# Patient Record
Sex: FEMALE | Race: WHITE | NOT HISPANIC OR LATINO | Employment: FULL TIME | RURAL
[De-identification: names, ages, dates, MRNs, and addresses within clinical notes are randomized per-mention and may not be internally consistent; named-entity substitution may affect disease eponyms.]

---

## 2020-08-20 ENCOUNTER — OFFICE VISIT (OUTPATIENT)
Dept: FAMILY MEDICINE CLINIC | Facility: CLINIC | Age: 40
End: 2020-08-20

## 2020-08-20 VITALS
TEMPERATURE: 98.4 F | DIASTOLIC BLOOD PRESSURE: 72 MMHG | HEART RATE: 105 BPM | BODY MASS INDEX: 26.22 KG/M2 | SYSTOLIC BLOOD PRESSURE: 122 MMHG | HEIGHT: 68 IN | WEIGHT: 173 LBS | RESPIRATION RATE: 18 BRPM | OXYGEN SATURATION: 98 %

## 2020-08-20 DIAGNOSIS — A60.04 HERPES SIMPLEX VULVOVAGINITIS: ICD-10-CM

## 2020-08-20 DIAGNOSIS — Z00.00 ENCOUNTER FOR PHYSICAL EXAMINATION: Primary | ICD-10-CM

## 2020-08-20 DIAGNOSIS — L98.9 SKIN LESION: Primary | ICD-10-CM

## 2020-08-20 DIAGNOSIS — Z13.220 SCREENING FOR HYPERLIPIDEMIA: ICD-10-CM

## 2020-08-20 PROBLEM — N60.01: Status: ACTIVE | Noted: 2020-08-20

## 2020-08-20 PROBLEM — A60.00 GENITAL HERPES: Status: ACTIVE | Noted: 2020-08-20

## 2020-08-20 PROCEDURE — 99202 OFFICE O/P NEW SF 15 MIN: CPT | Performed by: FAMILY MEDICINE

## 2020-08-20 RX ORDER — VALACYCLOVIR HYDROCHLORIDE 500 MG/1
500 TABLET, FILM COATED ORAL EVERY 12 HOURS
Qty: 6 TABLET | Refills: 12 | Status: SHIPPED | OUTPATIENT
Start: 2020-08-20 | End: 2021-05-06 | Stop reason: SDUPTHER

## 2020-08-20 NOTE — PROGRESS NOTES
Subjective   Fiorella Lo is a 39 y.o. female.     Chief Complaint   Patient presents with   • Suspicious Skin Lesion       Lesion:   Fiorella Lo is here today for a lesion. The lesion appeared gradual and has been occurring for 3 years.. The course has been increasing in size. The lesion is characterized as red, no sign of infection. The lesion is located over chest, face. The symptoms have been associated with recent enlargement and clinically suspicious for malignancy.        The following portions of the patient's history were reviewed and updated as appropriate: allergies, current medications, past family history, past medical history, past social history, past surgical history and problem list.    Allergies:  No Known Allergies    Social History:  Social History     Socioeconomic History   • Marital status:      Spouse name: Not on file   • Number of children: Not on file   • Years of education: Not on file   • Highest education level: Not on file   Tobacco Use   • Smoking status: Current Some Day Smoker   • Smokeless tobacco: Never Used   • Tobacco comment: Social smoker   Substance and Sexual Activity   • Alcohol use: Yes     Comment: Socially   • Drug use: Never       Family History:  Family History   Problem Relation Age of Onset   • Breast cancer Mother    • Liver disease Father    • Breast cancer Maternal Grandmother    • Breast cancer Paternal Grandmother        Past Medical History :  Patient Active Problem List   Diagnosis   • Skin lesion   • Genital herpes       Medication List:    Current Outpatient Medications:   •  valACYclovir (VALTREX) 500 MG tablet, Take 1 tablet by mouth Every 12 (Twelve) Hours., Disp: 6 tablet, Rfl: 12    Past Surgical History:  Past Surgical History:   Procedure Laterality Date   • HYSTERECTOMY  2011     partial still has both ovaries   • TUBAL ABDOMINAL LIGATION  2008       Review of Systems:  Review of Systems   Constitutional: Negative for activity change and fever.  "  HENT: Negative for ear pain, rhinorrhea, sinus pressure and voice change.    Eyes: Negative for visual disturbance.   Respiratory: Negative for cough and shortness of breath.    Cardiovascular: Negative for chest pain.   Gastrointestinal: Negative for abdominal pain, diarrhea, nausea and vomiting.   Endocrine: Negative for cold intolerance and heat intolerance.   Genitourinary: Negative for frequency and urgency.   Musculoskeletal: Negative for arthralgias.   Skin: Negative for rash.   Neurological: Negative for syncope.   Hematological: Does not bruise/bleed easily.   Psychiatric/Behavioral: Negative for depressed mood. The patient is not nervous/anxious.        Physical Exam:  Vital Signs:  Visit Vitals  /72   Pulse 105   Temp 98.4 °F (36.9 °C)   Resp 18   Ht 172.7 cm (68\")   Wt 78.5 kg (173 lb)   LMP  (LMP Unknown) Comment: 2011   SpO2 98%   BMI 26.30 kg/m²       Physical Exam   Constitutional: She is oriented to person, place, and time. She appears well-developed and well-nourished. She is cooperative. No distress.   HENT:   Head: Normocephalic and atraumatic.   Right Ear: External ear normal.   Left Ear: External ear normal.   Nose: Nose normal.   Mouth/Throat: Oropharynx is clear and moist. No oropharyngeal exudate.   Eyes: Pupils are equal, round, and reactive to light. Conjunctivae and EOM are normal. Right eye exhibits no discharge. Left eye exhibits no discharge. No scleral icterus.   Neck: Normal range of motion. Neck supple. No thyromegaly present.   Cardiovascular: Normal rate, regular rhythm, normal heart sounds and intact distal pulses. Exam reveals no gallop and no friction rub.   No murmur heard.  Pulmonary/Chest: Effort normal and breath sounds normal. No respiratory distress. She has no wheezes. She has no rales.   Abdominal: Soft. Bowel sounds are normal. She exhibits no distension. There is no tenderness. There is no rebound and no guarding.   Musculoskeletal: Normal range of motion. She " exhibits no edema, tenderness or deformity.   Lymphadenopathy:     She has no cervical adenopathy.   Neurological: She is alert and oriented to person, place, and time. She displays normal reflexes. No cranial nerve deficit. She exhibits normal muscle tone. Coordination normal.   Skin: Skin is warm and dry. Capillary refill takes less than 2 seconds. Turgor is normal. No rash noted. She is not diaphoretic. No erythema. No pallor.   Right forehead and right upper chest with pink papules, pearly   Psychiatric: She has a normal mood and affect. Her behavior is normal.   Vitals reviewed.      Assessment and Plan:  Problem List Items Addressed This Visit        Musculoskeletal and Integument    Skin lesion - Primary    Overview     Forehead and chest  Will schedule removal            Genitourinary    Genital herpes    Current Assessment & Plan     Medication sent in for treatment as needed         Relevant Medications    valACYclovir (VALTREX) 500 MG tablet          An After Visit Summary and PPPS were given to the patient.             I wore protective equipment throughout this patient encounter to include mask and gloves. Hand hygiene was performed before donning protective equipment and after removal when leaving the room.

## 2020-08-27 ENCOUNTER — OFFICE VISIT (OUTPATIENT)
Dept: FAMILY MEDICINE CLINIC | Facility: CLINIC | Age: 40
End: 2020-08-27

## 2020-08-27 VITALS
HEIGHT: 68 IN | WEIGHT: 176.6 LBS | TEMPERATURE: 98 F | SYSTOLIC BLOOD PRESSURE: 116 MMHG | OXYGEN SATURATION: 98 % | DIASTOLIC BLOOD PRESSURE: 68 MMHG | HEART RATE: 89 BPM | RESPIRATION RATE: 18 BRPM | BODY MASS INDEX: 26.76 KG/M2

## 2020-08-27 DIAGNOSIS — Z00.01 ENCOUNTER FOR ROUTINE ADULT PHYSICAL EXAM WITH ABNORMAL FINDINGS: Primary | ICD-10-CM

## 2020-08-27 DIAGNOSIS — N63.0 BREAST LUMP: ICD-10-CM

## 2020-08-27 DIAGNOSIS — Z90.710 H/O VAGINAL HYSTERECTOMY: ICD-10-CM

## 2020-08-27 DIAGNOSIS — Z12.31 SCREENING MAMMOGRAM, ENCOUNTER FOR: ICD-10-CM

## 2020-08-27 LAB
BILIRUB BLD-MCNC: NEGATIVE MG/DL
CLARITY, POC: CLEAR
COLOR UR: YELLOW
GLUCOSE UR STRIP-MCNC: NEGATIVE MG/DL
KETONES UR QL: NEGATIVE
LEUKOCYTE EST, POC: NEGATIVE
NITRITE UR-MCNC: NEGATIVE MG/ML
PH UR: 6 [PH] (ref 5–8)
PROT UR STRIP-MCNC: ABNORMAL MG/DL
RBC # UR STRIP: NEGATIVE /UL
SP GR UR: 1.01 (ref 1–1.03)
UROBILINOGEN UR QL: NORMAL

## 2020-08-27 PROCEDURE — 99396 PREV VISIT EST AGE 40-64: CPT | Performed by: FAMILY MEDICINE

## 2020-08-27 PROCEDURE — 81003 URINALYSIS AUTO W/O SCOPE: CPT | Performed by: FAMILY MEDICINE

## 2020-08-27 NOTE — PROGRESS NOTES
"  Chief Complaint   Patient presents with   • Annual Exam         Subjective   Fiorella Lo is a 39 y.o. female here for a Well Woman Visit. Energy level is described as fair and she is sleeping well. She sleeps 6 hours nightly. Last pap was October 2018 in [place]. Contraception: none. Patient exercises irregularly. Nutrition is described as in general, a \"healthy\" diet  . Her   libido is normal. She reports that she does not perform monthly self breast exam.      I personally reviewed and updated the patient's allergies, medications, problem list, and past medical, surgical, social, and family history.     Allergies:  No Known Allergies    Social History:  Social History     Socioeconomic History   • Marital status:      Spouse name: Not on file   • Number of children: Not on file   • Years of education: Not on file   • Highest education level: Not on file   Tobacco Use   • Smoking status: Current Some Day Smoker   • Smokeless tobacco: Never Used   • Tobacco comment: Social smoker   Substance and Sexual Activity   • Alcohol use: Yes     Comment: Socially   • Drug use: Never       Family History:  Family History   Problem Relation Age of Onset   • Breast cancer Mother    • Liver disease Father    • Breast cancer Maternal Grandmother    • Breast cancer Paternal Grandmother        Past Medical History :  Active Ambulatory Problems     Diagnosis Date Noted   • Skin lesion 08/20/2020   • Genital herpes 08/20/2020   • Encounter for routine adult physical exam with abnormal findings 08/27/2020   • Breast lump 08/27/2020   • H/O vaginal hysterectomy 08/27/2020     Resolved Ambulatory Problems     Diagnosis Date Noted   • No Resolved Ambulatory Problems     Past Medical History:   Diagnosis Date   • Allergic        Medication List:    Current Outpatient Medications:   •  valACYclovir (VALTREX) 500 MG tablet, Take 1 tablet by mouth Every 12 (Twelve) Hours. (Patient taking differently: Take 500 mg by mouth Daily As " "Needed.), Disp: 6 tablet, Rfl: 12    Past Surgical History:  Past Surgical History:   Procedure Laterality Date   • HYSTERECTOMY  2011     partial still has both ovaries   • TUBAL ABDOMINAL LIGATION  2008       Depression Screen:   No flowsheet data found.    Fall Risk Screen:  RAMIRO Fall Risk Assessment has not been completed.    Review Of Systems:  Review of Systems   Constitutional: Negative for activity change and fever.   HENT: Negative for ear pain, rhinorrhea, sinus pressure and voice change.    Eyes: Negative for visual disturbance.   Respiratory: Negative for cough and shortness of breath.    Cardiovascular: Negative for chest pain.   Gastrointestinal: Negative for abdominal pain, diarrhea, nausea and vomiting.   Endocrine: Negative for cold intolerance and heat intolerance.   Genitourinary: Negative for frequency and urgency.   Musculoskeletal: Negative for arthralgias.   Skin: Negative for rash.   Neurological: Negative for syncope.   Hematological: Does not bruise/bleed easily.   Psychiatric/Behavioral: Negative for depressed mood. The patient is not nervous/anxious.        Physical Exam:  Vital Signs:  Visit Vitals  /68   Pulse 89   Temp 98 °F (36.7 °C)   Resp 18   Ht 172.7 cm (68\")   Wt 80.1 kg (176 lb 9.6 oz)   LMP  (LMP Unknown) Comment: 2011   SpO2 98%   BMI 26.85 kg/m²       Physical Exam   Constitutional: She is oriented to person, place, and time. She appears well-developed and well-nourished. No distress.   HENT:   Head: Normocephalic and atraumatic.   Right Ear: External ear normal. No drainage or swelling. Tympanic membrane is not erythematous. No middle ear effusion. cerumen impaction is not present.  Left Ear: External ear normal. No drainage or swelling. Tympanic membrane is not erythematous.  No middle ear effusion. An impacted cerumen is not present.  Nose: Nose normal.   Mouth/Throat: Oropharynx is clear and moist. No oropharyngeal exudate.   Eyes: Pupils are equal, round, and " reactive to light. Conjunctivae and EOM are normal. No scleral icterus.   Neck: Normal range of motion. Neck supple. No tracheal deviation present. No thyromegaly present.   Cardiovascular: Normal rate, regular rhythm, normal heart sounds and intact distal pulses. Exam reveals no friction rub.   No murmur heard.  Pulmonary/Chest: Effort normal and breath sounds normal. No stridor. No respiratory distress. She has no wheezes. She has no rales. Right breast exhibits mass. Right breast exhibits no inverted nipple, no nipple discharge, no skin change and no tenderness. Left breast exhibits no inverted nipple, no mass, no nipple discharge, no skin change and no tenderness.       Abdominal: Soft. Bowel sounds are normal. She exhibits no distension and no mass. There is no tenderness. No hernia.   Genitourinary: Rectum normal. Rectal exam shows guaiac negative stool. Uterus is absent.   Cervix is absent. Right adnexum displays no mass. Right adnexum is present.Left adnexum displays no mass. Left adnexum is present.No erythema or tenderness in the vagina. No vaginal discharge found.   Musculoskeletal: Normal range of motion. She exhibits no edema, tenderness or deformity.   Lymphadenopathy:     She has no cervical adenopathy.   Neurological: She is alert and oriented to person, place, and time. She has normal reflexes. She displays normal reflexes. No cranial nerve deficit or sensory deficit. She exhibits normal muscle tone. Coordination and gait normal.   Skin: Skin is warm and dry. No rash noted. She is not diaphoretic.   Psychiatric: She has a normal mood and affect. Her behavior is normal.   Vitals reviewed.        Assessment and Plan:  Problem List Items Addressed This Visit        Other    Encounter for routine adult physical exam with abnormal findings - Primary    Relevant Orders    POCT urinalysis dipstick, multipro (Completed)    Breast lump    Overview     Right at 10 o clock         Relevant Orders    Mammo  Diagnostic Digital Tomosynthesis Right With CAD    US Breast Right Limited    H/O vaginal hysterectomy    Overview     For bleeding. 2011  No cancer           Other Visit Diagnoses     Screening mammogram, encounter for        Relevant Orders    Mammo Screening Modified With Tomosynthesis Left With CAD          An After Visit Summary and PPPS were given to the patient.       Discussed wearing sunscreen, seatbelts. Avoidance or caution with alcohol. Safe sex practices discussed. Discussed screening as appropriate for age.     I wore protective equipment throughout this patient encounter to include mask and gloves. Hand hygiene was performed before donning protective equipment and after removal when leaving the room.

## 2020-08-28 LAB
ALBUMIN SERPL-MCNC: 4.7 G/DL (ref 3.8–4.8)
ALBUMIN/GLOB SERPL: 2.4 {RATIO} (ref 1.2–2.2)
ALP SERPL-CCNC: 37 IU/L (ref 39–117)
ALT SERPL-CCNC: 14 IU/L (ref 0–32)
AST SERPL-CCNC: 16 IU/L (ref 0–40)
BASOPHILS # BLD AUTO: 0.1 X10E3/UL (ref 0–0.2)
BASOPHILS NFR BLD AUTO: 1 %
BILIRUB SERPL-MCNC: 0.7 MG/DL (ref 0–1.2)
BUN SERPL-MCNC: 8 MG/DL (ref 6–24)
BUN/CREAT SERPL: 13 (ref 9–23)
CALCIUM SERPL-MCNC: 9.6 MG/DL (ref 8.7–10.2)
CHLORIDE SERPL-SCNC: 102 MMOL/L (ref 96–106)
CHOLEST SERPL-MCNC: 178 MG/DL (ref 100–199)
CHOLEST/HDLC SERPL: 3.5 RATIO (ref 0–4.4)
CO2 SERPL-SCNC: 26 MMOL/L (ref 20–29)
CREAT SERPL-MCNC: 0.62 MG/DL (ref 0.57–1)
EOSINOPHIL # BLD AUTO: 0.2 X10E3/UL (ref 0–0.4)
EOSINOPHIL NFR BLD AUTO: 2 %
ERYTHROCYTE [DISTWIDTH] IN BLOOD BY AUTOMATED COUNT: 12.3 % (ref 11.7–15.4)
GLOBULIN SER CALC-MCNC: 2 G/DL (ref 1.5–4.5)
GLUCOSE SERPL-MCNC: 84 MG/DL (ref 65–99)
HCT VFR BLD AUTO: 42.1 % (ref 34–46.6)
HDLC SERPL-MCNC: 51 MG/DL
HGB BLD-MCNC: 14.3 G/DL (ref 11.1–15.9)
IMM GRANULOCYTES # BLD AUTO: 0 X10E3/UL (ref 0–0.1)
IMM GRANULOCYTES NFR BLD AUTO: 0 %
LDLC SERPL CALC-MCNC: 112 MG/DL (ref 0–99)
LYMPHOCYTES # BLD AUTO: 2.6 X10E3/UL (ref 0.7–3.1)
LYMPHOCYTES NFR BLD AUTO: 31 %
MCH RBC QN AUTO: 31.6 PG (ref 26.6–33)
MCHC RBC AUTO-ENTMCNC: 34 G/DL (ref 31.5–35.7)
MCV RBC AUTO: 93 FL (ref 79–97)
MONOCYTES # BLD AUTO: 0.5 X10E3/UL (ref 0.1–0.9)
MONOCYTES NFR BLD AUTO: 6 %
NEUTROPHILS # BLD AUTO: 5.1 X10E3/UL (ref 1.4–7)
NEUTROPHILS NFR BLD AUTO: 60 %
PLATELET # BLD AUTO: 345 X10E3/UL (ref 150–450)
POTASSIUM SERPL-SCNC: 4.3 MMOL/L (ref 3.5–5.2)
PROT SERPL-MCNC: 6.7 G/DL (ref 6–8.5)
RBC # BLD AUTO: 4.52 X10E6/UL (ref 3.77–5.28)
SODIUM SERPL-SCNC: 142 MMOL/L (ref 134–144)
TRIGL SERPL-MCNC: 76 MG/DL (ref 0–149)
TSH SERPL DL<=0.005 MIU/L-ACNC: 2.1 UIU/ML (ref 0.45–4.5)
VLDLC SERPL CALC-MCNC: 15 MG/DL (ref 5–40)
WBC # BLD AUTO: 8.5 X10E3/UL (ref 3.4–10.8)

## 2020-08-31 ENCOUNTER — TELEPHONE (OUTPATIENT)
Dept: FAMILY MEDICINE CLINIC | Facility: CLINIC | Age: 40
End: 2020-08-31

## 2020-09-04 ENCOUNTER — HOSPITAL ENCOUNTER (OUTPATIENT)
Dept: MAMMOGRAPHY | Facility: HOSPITAL | Age: 40
Discharge: HOME | End: 2020-09-04
Admitting: FAMILY MEDICINE

## 2020-09-04 ENCOUNTER — HOSPITAL ENCOUNTER (OUTPATIENT)
Dept: ULTRASOUND IMAGING | Facility: HOSPITAL | Age: 40
Discharge: HOME | End: 2020-09-04

## 2020-09-04 DIAGNOSIS — N63.0 BREAST LUMP: ICD-10-CM

## 2020-09-04 PROCEDURE — 76642 ULTRASOUND BREAST LIMITED: CPT

## 2020-09-04 PROCEDURE — 77066 DX MAMMO INCL CAD BI: CPT

## 2020-09-04 PROCEDURE — G0279 TOMOSYNTHESIS, MAMMO: HCPCS

## 2020-09-10 ENCOUNTER — PROCEDURE VISIT (OUTPATIENT)
Dept: FAMILY MEDICINE CLINIC | Facility: CLINIC | Age: 40
End: 2020-09-10

## 2020-09-10 VITALS
DIASTOLIC BLOOD PRESSURE: 62 MMHG | WEIGHT: 176 LBS | OXYGEN SATURATION: 100 % | BODY MASS INDEX: 26.67 KG/M2 | HEART RATE: 73 BPM | SYSTOLIC BLOOD PRESSURE: 101 MMHG | HEIGHT: 68 IN | RESPIRATION RATE: 18 BRPM | TEMPERATURE: 98.3 F

## 2020-09-10 DIAGNOSIS — L57.0 AK (ACTINIC KERATOSIS): ICD-10-CM

## 2020-09-10 DIAGNOSIS — L98.9 SKIN LESION: Primary | ICD-10-CM

## 2020-09-10 DIAGNOSIS — N63.0 BREAST LUMP: ICD-10-CM

## 2020-09-10 PROCEDURE — 11105 PUNCH BX SKIN EA SEP/ADDL: CPT | Performed by: FAMILY MEDICINE

## 2020-09-10 PROCEDURE — 11440 EXC FACE-MM B9+MARG 0.5 CM/<: CPT | Performed by: FAMILY MEDICINE

## 2020-09-10 RX ORDER — FEXOFENADINE HCL 180 MG/1
180 TABLET ORAL DAILY
COMMUNITY

## 2020-09-10 NOTE — PROGRESS NOTES
Lesion:   Fiorella Lo is here today for a lesion on forehead. The lesion appeared gradual and has been occurring for 2.5 years ago.. The course has been increasing. The lesion is characterized as red. The lesion is located over face. The symptoms have been associated with significant change in physical appearance(reddening or pigmentation changes).     Fiorella Lo is here today for a lesion on chest. The lesion appeared gradual and has been occurring for 1 years ago.. The course has been increasing. The lesion is characterized as red. The lesion is located over face. The symptoms have been associated with significant change in physical appearance(reddening or pigmentation changes).       Review of Systems   Constitutional: Negative for activity change and fever.   HENT: Negative for ear pain, rhinorrhea, sinus pressure and voice change.    Eyes: Negative for visual disturbance.   Respiratory: Negative for cough and shortness of breath.    Cardiovascular: Negative for chest pain.   Gastrointestinal: Negative for abdominal pain, diarrhea, nausea and vomiting.   Endocrine: Negative for cold intolerance and heat intolerance.   Genitourinary: Negative for frequency and urgency.   Musculoskeletal: Negative for arthralgias.   Skin: Negative for rash.   Neurological: Negative for syncope.   Hematological: Does not bruise/bleed easily.   Psychiatric/Behavioral: Negative for depressed mood. The patient is not nervous/anxious.        Physical Exam   Skin:   Right forehead with pink papule  Right mid chest with pink papule  Left forehead temple with scaly pink macules         Excision Procedure Note    Pre-operative Diagnosis: Suspicious lesion    Post-operative Diagnosis: same    Locations: right forehead    Indications: change in appearance    Anesthesia: Lidocaine 1% without epinephrine     Procedure Details   The risks, benefits, indications, potential complications, and alternatives were explained to the patient and  informed consent obtained.    The lesion and surrounding area was given a sterile prep using betadyne and draped in the usual sterile fashion. A scalpel was used to excise an elliptical area of skin approximately 0.6 by 0.5cmcm. The wound was closed with 5-0 Nylon using simple interrupted stitches. Antibiotic ointment and a sterile dressing applied. The specimen was sent for pathologic examination. The patient tolerated the procedure well.      BIOPSY PROCEDURE:   Biopsy of suspicious lesion. Lesion displays bleeding, inflammation and trauma because of location.  Location: right chest  Size: 0.3cm x 0.4cm    Informed Consent: The indications, risks, benefits and alternatives to the procedure, including no procedure, were discussed in detail.  Risks of the procedure were described and verbal consent was obtained.    The surgical site was prepped with betadyne.  Sterile technique was used throughout the procedure.   Anesthesia was obtained using lidocaine with epi.      A biopsy was obtained from the lesion using 3.5mm punch  The surgical site was closed using 3-O nylon.    The procedure was well tolerated without complications.  Blood loss was minimal. The specimen was submitted to pathology. The patient was educated about signs of infection, and wound care instructions were reviewed.  The patient will be contacted in 7-10 days with the biopsy results and a follow up plan will be discussed at that time.      Cryotherapy, Skin Lesion  Date/Time: 9/10/2020 4:19 PM  Performed by: Brooklyn Encinas MD  Authorized by: Brooklyn Encinas MD   Preparation: Patient was prepped and draped in the usual sterile fashion.  Local anesthesia used: no    Anesthesia:  Local anesthesia used: no    Sedation:  Patient sedated: no    Patient tolerance: Patient tolerated the procedure well with no immediate complications      Cryo done  On left fore head    EBL: minimal      Condition: Stable    Complications:  None    Plan:  1. Instructed  to keep the wound dry and covered for 24-48 hours and clean thereafter.  2. Warning signs of infection were reviewed.    3. Recommended that the patient use OTC acetaminophen, OTC analgesics and OTC ibuprofen as needed for pain.   4. Return for suture removal in 7 days.    Problem List Items Addressed This Visit        Musculoskeletal and Integument    Skin lesion - Primary    Relevant Orders    Reference Histopathology (Completed)    Reference Histopathology       Other    Breast lump    Relevant Orders    Ambulatory Referral to Hematology      Other Visit Diagnoses     AK (actinic keratosis)        Relevant Orders    Cryotherapy, Skin Lesion          I wore protective equipment throughout this patient encounter to include mask, gloves and eye protection. Hand hygiene was performed before donning protective equipment and after removal when leaving the room.

## 2020-09-10 NOTE — PATIENT INSTRUCTIONS
Cryosurgery for Skin Conditions, Care After  These instructions give you information on caring for yourself after your procedure. Your doctor may also give you more specific instructions. Call your doctor if you have any problems or questions after your procedure.  Follow these instructions at home:  Caring for the treated area    · Follow instructions from your doctor about how to take care of the treated area. Make sure you:  ? Keep the area covered with a bandage (dressing) until it heals, or for as long as told by your doctor.  ? Wash your hands with soap and water before you change your bandage. If you do not have soap and water, use hand .  ? Change your bandage as told by your doctor.  ? Keep the bandage and the treated area clean and dry. If the bandage gets wet, change it right away.  ? Clean the treated area with soap and water.  · Check the treated area every day for signs of infection. Check for:  ? More redness, swelling, or pain.  ? More fluid or blood.  ? Warmth.  ? Pus or a bad smell.  General instructions  · Do not pick at your blister. Do not try to break it open. This can cause infection and scarring.  · Do not put any medicine, cream, or lotion on the treated area unless told by your doctor.  · Take over-the-counter and prescription medicines only as told by your doctor.  · Keep all follow-up visits as told by your doctor. This is important.  Contact a doctor if:  · You have more redness, swelling, or pain around the treated area.  · You have more fluid or blood coming from the treated area.  · The treated area feels warm to the touch.  · You have pus or a bad smell coming from the treated area.  · Your blister gets large and painful.  Get help right away if:  · You have a fever and have redness spreading from the treated area.  Summary  · You should keep the treated area and your bandage clean and dry.  · Check the treated area every day for signs of infection. Signs include fluid, pus,  warmth, or having more redness, swelling, or pain.  · Do not pick at your blister. Do not try to break it open.  This information is not intended to replace advice given to you by your health care provider. Make sure you discuss any questions you have with your health care provider.  Document Released: 03/11/2013 Document Revised: 11/30/2018 Document Reviewed: 11/06/2017  [employer] Patient Education © 2020 [employer]  Skin Biopsy, Care After  This sheet gives you information about how to care for yourself after your procedure. Your health care provider may also give you more specific instructions. If you have problems or questions, contact your health care provider.  What can I expect after the procedure?  After the procedure, it is common to have:  · Soreness.  · Bruising.  · Itching.  Follow these instructions at home:  Biopsy site care  Follow instructions from your health care provider about how to take care of your biopsy site. Make sure you:  · Wash your hands with soap and water before and after you change your bandage (dressing). If soap and water are not available, use hand .  · Apply ointment on your biopsy site as directed by your health care provider.  · Change your dressing as told by your health care provider.  · Leave stitches (sutures), skin glue, or adhesive strips in place. These skin closures may need to stay in place for 2 weeks or longer. If adhesive strip edges start to loosen and curl up, you may trim the loose edges. Do not remove adhesive strips completely unless your health care provider tells you to do that.  · If the biopsy area bleeds, apply gentle pressure for 10 minutes.  Check your biopsy site every day for signs of infection. Check for:  · Redness, swelling, or pain.  · Fluid or blood.  · Warmth.  · Pus or a bad smell.    General instructions  · Rest and then return to your normal activities as told by your health care provider.  · Take over-the-counter and prescription  medicines only as told by your health care provider.  · Keep all follow-up visits as told by your health care provider. This is important.  Contact a health care provider if:  · You have redness, swelling, or pain around your biopsy site.  · You have fluid or blood coming from your biopsy site.  · Your biopsy site feels warm to the touch.  · You have pus or a bad smell coming from your biopsy site.  · You have a fever.  · Your sutures, skin glue, or adhesive strips loosen or come off sooner than expected.  Get help right away if:  · You have bleeding that does not stop with pressure or a dressing.  Summary  · After the procedure, it is common to have soreness, bruising, and itching at the site.  · Follow instructions from your health care provider about how to take care of your biopsy site.  · Check your biopsy site every day for signs of infection.  · Contact a health care provider if you have redness, swelling, or pain around your biopsy site, or your biopsy site feels warm to the touch.  · Keep all follow-up visits as told by your health care provider. This is important.  This information is not intended to replace advice given to you by your health care provider. Make sure you discuss any questions you have with your health care provider.  Document Released: 01/13/2017 Document Revised: 06/17/2019 Document Reviewed: 06/17/2019  [employer] Patient Education © 2020 [employer]  Excision of Skin Lesions, Care After  This sheet gives you information about how to care for yourself after your procedure. Your health care provider may also give you more specific instructions. If you have problems or questions, contact your health care provider.  What can I expect after the procedure?  After your procedure, it is common to have pain or discomfort at the excision site.  Follow these instructions at home:  Excision care    · Follow instructions from your health care provider about how to take care of your excision site.  Make sure you:  ? Wash your hands with soap and water before and after you change your bandage (dressing). If soap and water are not available, use hand .  ? Change your dressing as told by your health care provider.  ? Leave stitches (sutures), skin glue, or adhesive strips in place. These skin closures may need to stay in place for 2 weeks or longer. If adhesive strip edges start to loosen and curl up, you may trim the loose edges. Do not remove adhesive strips completely unless your health care provider tells you to do that.  · Check the excision area every day for signs of infection. Watch for:  ? Redness, swelling, or pain.  ? Fluid or blood.  ? Warmth.  ? Pus or a bad smell.  · Keep the site clean, dry, and protected for at least 48 hours.  · For bleeding, apply gentle but firm pressure to the area using a folded towel for 20 minutes.  · Avoid high-impact exercise and activities until the sutures are removed or the area heals.  General instructions  · Take over-the-counter and prescription medicines only as told by your health care provider.  · Follow instructions from your health care provider about how to minimize scarring. Scarring should lessen over time.  · Avoid sun exposure until the area has healed. Use sunscreen to protect the area from the sun after it has healed.  · Keep all follow-up visits as told by your health care provider. This is important.  Contact a health care provider if:  · You have redness, swelling, or pain around your excision site.  · You have fluid or blood coming from your excision site.  · Your excision site feels warm to the touch.  · You have pus or a bad smell coming from your excision site.  · You have a fever.  · You have pain that does not improve in 2-3 days after your procedure.  · You notice skin irregularities or changes in how you feel (sensation).  Summary  · This sheet of instructions provides you with information about caring for yourself after your  procedure. Contact your health care provider if you have any problems or questions.  · Take over-the-counter and prescription medicines only as told by your health care provider.  · Change your dressing as told by your health care provider.  · Contact a health care provider if you have redness, swelling, pain, or other signs of infection around your excision site.  · Keep all follow-up visits as told by your health care provider. This is important.  This information is not intended to replace advice given to you by your health care provider. Make sure you discuss any questions you have with your health care provider.  Document Released: 05/03/2016 Document Revised: 06/26/2019 Document Reviewed: 06/26/2019  [employer] Patient Education © 2020 [employer]

## 2020-09-14 ENCOUNTER — TELEPHONE (OUTPATIENT)
Dept: ONCOLOGY | Facility: CLINIC | Age: 40
End: 2020-09-14

## 2020-09-14 LAB
DX ICD CODE: NORMAL
PATH REPORT.FINAL DX SPEC: NORMAL
PATH REPORT.GROSS SPEC: NORMAL
PATH REPORT.SITE OF ORIGIN SPEC: NORMAL
PATHOLOGIST NAME: NORMAL
PAYMENT PROCEDURE: NORMAL

## 2020-09-15 ENCOUNTER — TELEPHONE (OUTPATIENT)
Dept: FAMILY MEDICINE CLINIC | Facility: CLINIC | Age: 40
End: 2020-09-15

## 2020-09-15 NOTE — TELEPHONE ENCOUNTER
----- Message from Brooklyn Encinas MD sent at 9/14/2020  4:32 PM EDT -----  The forehead is benign. The chest is basal cell skin cancer and I got it all.

## 2020-09-16 NOTE — TELEPHONE ENCOUNTER
DON FOR PT ASKING HER TO CALL MY DIRECT LINE IF SHE STILL NEEDS ASSISTANCE WITH HER APPOINTMENT TOMORROW

## 2020-09-17 ENCOUNTER — OFFICE VISIT (OUTPATIENT)
Dept: FAMILY MEDICINE CLINIC | Facility: CLINIC | Age: 40
End: 2020-09-17

## 2020-09-17 VITALS
OXYGEN SATURATION: 99 % | TEMPERATURE: 98.6 F | RESPIRATION RATE: 18 BRPM | WEIGHT: 176 LBS | HEIGHT: 68 IN | HEART RATE: 95 BPM | DIASTOLIC BLOOD PRESSURE: 76 MMHG | BODY MASS INDEX: 26.67 KG/M2 | SYSTOLIC BLOOD PRESSURE: 118 MMHG

## 2020-09-17 DIAGNOSIS — C44.519 BASAL CELL CARCINOMA (BCC) OF CHEST: Primary | ICD-10-CM

## 2020-09-17 DIAGNOSIS — L98.9 SKIN LESION: ICD-10-CM

## 2020-09-17 PROCEDURE — 99024 POSTOP FOLLOW-UP VISIT: CPT | Performed by: FAMILY MEDICINE

## 2020-09-17 NOTE — PROGRESS NOTES
Judy Lo is a 40 y.o. female.     Chief Complaint   Patient presents with   • Suture / Staple Removal       Suture / Staple Removal  The sutures were placed 7 to 10 days ago. She tried antibiotic ointment use and a wound recheck since the wound repair. The treatment provided significant relief. The maximum temperature noted was less than 100.4 F. The temperature was taken using a tympanic thermometer. There has been no drainage from the wound. There is no redness present. There is no swelling present. There is no pain present. She has no difficulty moving the affected extremity or digit.        The following portions of the patient's history were reviewed and updated as appropriate: allergies, current medications, past family history, past medical history, past social history, past surgical history and problem list.    Allergies:  No Known Allergies    Social History:  Social History     Socioeconomic History   • Marital status:      Spouse name: Not on file   • Number of children: Not on file   • Years of education: Not on file   • Highest education level: Not on file   Tobacco Use   • Smoking status: Current Some Day Smoker   • Smokeless tobacco: Never Used   • Tobacco comment: Social smoker   Substance and Sexual Activity   • Alcohol use: Yes     Comment: Socially   • Drug use: Never       Family History:  Family History   Problem Relation Age of Onset   • Breast cancer Mother    • Liver disease Father    • Breast cancer Maternal Grandmother    • Breast cancer Paternal Grandmother        Past Medical History :  Patient Active Problem List   Diagnosis   • Skin lesion   • Genital herpes   • Encounter for routine adult physical exam with abnormal findings   • Breast lump   • H/O vaginal hysterectomy   • Basal cell carcinoma (BCC) of chest       Medication List:    Current Outpatient Medications:   •  Biotin 47492 MCG tablet dispersible, Place  on the tongue., Disp: , Rfl:   •  fexofenadine  "(Allegra Allergy) 180 MG tablet, Take 180 mg by mouth Daily., Disp: , Rfl:   •  valACYclovir (VALTREX) 500 MG tablet, Take 1 tablet by mouth Every 12 (Twelve) Hours. (Patient taking differently: Take 500 mg by mouth Daily As Needed.), Disp: 6 tablet, Rfl: 12    Past Surgical History:  Past Surgical History:   Procedure Laterality Date   • HYSTERECTOMY  2011     partial still has both ovaries   • TUBAL ABDOMINAL LIGATION  2008       Review of Systems:  Review of Systems   Skin:        Sutures intact       Physical Exam:  Vital Signs:  Visit Vitals  /76   Pulse 95   Temp 98.6 °F (37 °C)   Resp 18   Ht 172.7 cm (68\")   Wt 79.8 kg (176 lb)   LMP  (LMP Unknown) Comment: 2011   SpO2 99%   BMI 26.76 kg/m²       Physical Exam  Skin:     Comments: Sutures intact on chest and forehead         Assessment and Plan:  Problem List Items Addressed This Visit        Musculoskeletal and Integument    Skin lesion    Overview     Forehead sutures removed and path was benign            Other    Basal cell carcinoma (BCC) of chest - Primary    Overview     All removed by biopsy               An After Visit Summary and PPPS were given to the patient.             I wore protective equipment throughout this patient encounter to include mask, gloves and eye protection. Hand hygiene was performed before donning protective equipment and after removal when leaving the room.   "

## 2020-12-18 ENCOUNTER — TELEPHONE (OUTPATIENT)
Dept: FAMILY MEDICINE CLINIC | Facility: CLINIC | Age: 40
End: 2020-12-18

## 2020-12-18 NOTE — TELEPHONE ENCOUNTER
Pt called stating that her son was tested for Covid and it was positive. She said he has no symptoms but needs to know what the household should do including her son.

## 2021-05-05 ENCOUNTER — ANTICOAGULATION VISIT (OUTPATIENT)
Dept: FAMILY MEDICINE CLINIC | Facility: CLINIC | Age: 41
End: 2021-05-05

## 2021-05-05 ENCOUNTER — TELEPHONE (OUTPATIENT)
Dept: FAMILY MEDICINE CLINIC | Facility: CLINIC | Age: 41
End: 2021-05-05

## 2021-05-05 DIAGNOSIS — A60.04 HERPES SIMPLEX VULVOVAGINITIS: Primary | ICD-10-CM

## 2021-05-05 DIAGNOSIS — B37.31 VAGINAL YEAST INFECTION: ICD-10-CM

## 2021-05-05 NOTE — TELEPHONE ENCOUNTER
Spoke with patient. She states that insurance will not cover 500mg. They have always covered 1g and wanted to know if we could try that? She also gets recurrent yeast infections and requested diflucan.

## 2021-05-06 RX ORDER — VALACYCLOVIR HYDROCHLORIDE 1 G/1
1000 TABLET, FILM COATED ORAL DAILY
Qty: 30 TABLET | Refills: 12 | Status: SHIPPED | OUTPATIENT
Start: 2021-05-06 | End: 2021-12-30 | Stop reason: SDUPTHER

## 2021-05-06 RX ORDER — FLUCONAZOLE 150 MG/1
150 TABLET ORAL ONCE
Qty: 1 TABLET | Refills: 0 | Status: SHIPPED | OUTPATIENT
Start: 2021-05-06 | End: 2021-05-06

## 2021-06-09 ENCOUNTER — TELEPHONE (OUTPATIENT)
Dept: FAMILY MEDICINE CLINIC | Facility: CLINIC | Age: 41
End: 2021-06-09

## 2021-06-09 NOTE — TELEPHONE ENCOUNTER
Caller: Fiorella Lo    Relationship: Self    Best call back number: [phone]    What medication are you requesting: DR. ALLISON      What are your current symptoms: BLOOD IN URINE, PAINFUL URINATION     How long have you been experiencing symptoms: SINCE THIS AM     Have you had these symptoms before:    [x] Yes  [] No    Have you been treated for these symptoms before:   [x] Yes  [] No    If a prescription is needed, what is your preferred pharmacy and phone number: [facility] [address] [place], [place] [address], [place] - [address] [facility] [address]  - [phone] [facility] [phone] FX

## 2021-06-10 NOTE — TELEPHONE ENCOUNTER
Called pt and let her know that she should go to urgent so that she can have a u/a or urine culture to make sure it is a UTI

## 2021-09-03 PROCEDURE — U0004 COV-19 TEST NON-CDC HGH THRU: HCPCS | Performed by: PAIN MEDICINE

## 2021-09-09 ENCOUNTER — TELEPHONE (OUTPATIENT)
Dept: FAMILY MEDICINE CLINIC | Facility: CLINIC | Age: 41
End: 2021-09-09

## 2021-09-09 NOTE — TELEPHONE ENCOUNTER
Caller: Fiorella Lo    Relationship to patient: Self    Best call back number: [phone]    Date of positive COVID19 test: 9/3    COVID19 symptoms: COUGHING, CONGESTION, LOSING VOICE, YELLOW MUCUS    Additional information or concerns: PATIENT HAD A SINUS INFECTION BEFORE SHE TESTED POSITIVE FOR COVID, STILL IS NOT FEELING BETTER AND ITS BEEN THREE WEEKS. PLEASE ADVISE. PATIENT WOULD LIKE SOMETHING TO HELP COUGH.     What is the patients preferred pharmacy:   [facility]  [place], [place] - [address] [facility] [address]  - [phone] [facility] [phone] FX

## 2021-09-14 NOTE — TELEPHONE ENCOUNTER
Patient is feeling better I did make her, her annual for next week and to check on how shes doing.

## 2021-10-07 NOTE — PROGRESS NOTES
"  Chief Complaint   Patient presents with   • Annual Exam         Subjective   Fiorella Lo is a 41 y.o. female here for a Well Woman Visit. Energy level is described as good and she is sleeping fairly well. She sleeps 6 hours nightly. Patient has had hysterectomy still has ovaries. Contraception: none. Patient exercises not at all. Nutrition is described as in general, an \"unhealthy\" diet. Her   libido is normal. She reports that she does perform monthly self breast exam.      History of Present Illness     I personally reviewed and updated the patient's allergies, medications, problem list, and past medical, surgical, social, and family history.     Allergies:  No Known Allergies    Social History:  Social History     Socioeconomic History   • Marital status:    Tobacco Use   • Smoking status: Current Some Day Smoker     Types: Cigarettes   • Smokeless tobacco: Never Used   • Tobacco comment: Social smoker   Vaping Use   • Vaping Use: Never used   Substance and Sexual Activity   • Alcohol use: Yes     Comment: Socially   • Drug use: Never       Family History:  Family History   Problem Relation Age of Onset   • Breast cancer Mother    • Liver disease Father    • Breast cancer Maternal Grandmother    • Breast cancer Paternal Grandmother        Past Medical History :  Active Ambulatory Problems     Diagnosis Date Noted   • Genital herpes 08/20/2020   • Encounter for routine adult physical exam with abnormal findings 08/27/2020   • Breast lump 08/27/2020   • H/O vaginal hysterectomy 08/27/2020     Resolved Ambulatory Problems     Diagnosis Date Noted   • Skin lesion 08/20/2020   • Basal cell carcinoma (BCC) of chest 09/17/2020     Past Medical History:   Diagnosis Date   • Allergic        Medication List:    Current Outpatient Medications:   •  fexofenadine (Allegra Allergy) 180 MG tablet, Take 180 mg by mouth Daily., Disp: , Rfl:   •  fluticasone (FLONASE) 50 MCG/ACT nasal spray, 2 sprays into the nostril(s) " "as directed by provider Daily., Disp: , Rfl:   •  valACYclovir (VALTREX) 1000 MG tablet, Take 1 tablet by mouth Daily., Disp: 30 tablet, Rfl: 12    Past Surgical History:  Past Surgical History:   Procedure Laterality Date   • HYSTERECTOMY  2011     partial still has both ovaries   • TUBAL ABDOMINAL LIGATION  2008       Depression Screen:   PHQ-2/PHQ-9 Depression Screening 9/10/2020   Little interest or pleasure in doing things 0   Feeling down, depressed, or hopeless 0   Total Score 0       Fall Risk Screen:  RAMIRO Fall Risk Assessment has not been completed.    Review Of Systems:  Review of Systems   Constitutional: Negative for activity change and fever.   HENT: Negative for ear pain, rhinorrhea, sinus pressure and voice change.    Eyes: Negative for visual disturbance.   Respiratory: Negative for cough and shortness of breath.    Cardiovascular: Negative for chest pain.   Gastrointestinal: Negative for abdominal pain, diarrhea, nausea and vomiting.   Endocrine: Negative for cold intolerance and heat intolerance.   Genitourinary: Negative for frequency and urgency.   Musculoskeletal: Negative for arthralgias.   Skin: Negative for rash.   Neurological: Negative for syncope.   Hematological: Does not bruise/bleed easily.   Psychiatric/Behavioral: Negative for depressed mood. The patient is not nervous/anxious.        Physical Exam:  Vital Signs:  Visit Vitals  /76   Pulse 85   Temp 97.1 °F (36.2 °C)   Resp 18   Ht 172.7 cm (68\")   Wt 80.8 kg (178 lb 3.2 oz)   LMP  (LMP Unknown) Comment: 2011   SpO2 99%   BMI 27.10 kg/m²       Physical Exam  Vitals reviewed. Exam conducted with a chaperone present.   Constitutional:       General: She is not in acute distress.     Appearance: She is well-developed. She is not diaphoretic.   HENT:      Head: Normocephalic and atraumatic.      Right Ear: Hearing and external ear normal.      Left Ear: Hearing and external ear normal.      Nose: Nose normal.      Mouth/Throat: "      Pharynx: No oropharyngeal exudate.   Eyes:      General: No scleral icterus.        Right eye: No discharge.         Left eye: No discharge.      Conjunctiva/sclera: Conjunctivae normal.      Pupils: Pupils are equal, round, and reactive to light.   Neck:      Thyroid: No thyromegaly.      Trachea: No tracheal deviation.   Cardiovascular:      Rate and Rhythm: Normal rate and regular rhythm.      Heart sounds: Normal heart sounds. No murmur heard.   No friction rub. No gallop.    Pulmonary:      Effort: Pulmonary effort is normal. No respiratory distress.      Breath sounds: Normal breath sounds. No wheezing or rales.   Chest:      Breasts:         Right: No inverted nipple, mass, nipple discharge, skin change or tenderness.         Left: No inverted nipple, mass, nipple discharge, skin change or tenderness.   Abdominal:      General: Bowel sounds are normal. There is no distension.      Palpations: Abdomen is soft. There is no mass.      Tenderness: There is no abdominal tenderness. There is no guarding or rebound.      Hernia: No hernia is present.   Genitourinary:     Labia:         Right: No rash or lesion.         Left: No rash or lesion.       Vagina: Normal.      Adnexa:         Right: No mass.          Left: No mass.        Rectum: Normal.   Musculoskeletal:         General: No tenderness or deformity. Normal range of motion.      Cervical back: Normal range of motion and neck supple.   Lymphadenopathy:      Cervical: No cervical adenopathy.   Skin:     General: Skin is warm and dry.      Capillary Refill: Capillary refill takes less than 2 seconds.      Findings: No erythema or rash.   Neurological:      Mental Status: She is alert and oriented to person, place, and time.      Cranial Nerves: No cranial nerve deficit.      Sensory: No sensory deficit.      Motor: No abnormal muscle tone.      Coordination: Coordination normal.      Deep Tendon Reflexes: Reflexes normal.   Psychiatric:         Behavior:  Behavior normal.           Assessment and Plan:  Problem List Items Addressed This Visit        Genitourinary and Reproductive     H/O vaginal hysterectomy    Overview     For bleeding. 2011  No cancer            Health Encounters    Encounter for routine adult physical exam with abnormal findings - Primary    Relevant Orders    CBC & Differential    TSH      Other Visit Diagnoses     Screening for hyperlipidemia        Relevant Orders    Comprehensive Metabolic Panel    Lipid Panel With / Chol / HDL Ratio    Encounter for physical examination        Screening mammogram, encounter for        Relevant Orders    Mammo Screening Digital Tomosynthesis Bilateral With CAD    Need for hepatitis C screening test        Relevant Orders    Hepatitis C RNA, Quantitative, PCR (graph)          An After Visit Summary and PPPS were given to the patient.       Discussed injury prevention, diet and exercise, safe sexual practices, and screening for common diseases. Encouraged use of sunscreen and seatbelts. Discussed timing of  cervical cancer screening. Encouraged monthly self-breast exams, yearly clinical breast exams, and discussed timing of mammograms. Avoidance of tobacco encouraged. Limitation or avoidance of alcohol encouraged. Recommend yearly dental and eye exams. Also discussed monitoring of blood pressure, lipids.     I wore protective equipment throughout this patient encounter to include mask and eye protection. Hand hygiene was performed before donning protective equipment and after removal when leaving the room.

## 2021-10-08 ENCOUNTER — OFFICE VISIT (OUTPATIENT)
Dept: FAMILY MEDICINE CLINIC | Facility: CLINIC | Age: 41
End: 2021-10-08

## 2021-10-08 VITALS
HEIGHT: 68 IN | BODY MASS INDEX: 27.01 KG/M2 | OXYGEN SATURATION: 99 % | HEART RATE: 85 BPM | RESPIRATION RATE: 18 BRPM | WEIGHT: 178.2 LBS | DIASTOLIC BLOOD PRESSURE: 76 MMHG | SYSTOLIC BLOOD PRESSURE: 118 MMHG | TEMPERATURE: 97.1 F

## 2021-10-08 DIAGNOSIS — Z11.59 NEED FOR HEPATITIS C SCREENING TEST: ICD-10-CM

## 2021-10-08 DIAGNOSIS — Z00.01 ENCOUNTER FOR ROUTINE ADULT PHYSICAL EXAM WITH ABNORMAL FINDINGS: Primary | ICD-10-CM

## 2021-10-08 DIAGNOSIS — Z00.00 ENCOUNTER FOR PHYSICAL EXAMINATION: ICD-10-CM

## 2021-10-08 DIAGNOSIS — Z90.710 H/O VAGINAL HYSTERECTOMY: ICD-10-CM

## 2021-10-08 DIAGNOSIS — Z13.220 SCREENING FOR HYPERLIPIDEMIA: ICD-10-CM

## 2021-10-08 DIAGNOSIS — Z12.31 SCREENING MAMMOGRAM, ENCOUNTER FOR: ICD-10-CM

## 2021-10-08 PROBLEM — C44.519 BASAL CELL CARCINOMA (BCC) OF CHEST: Status: RESOLVED | Noted: 2020-09-17 | Resolved: 2021-10-08

## 2021-10-08 PROBLEM — L98.9 SKIN LESION: Status: RESOLVED | Noted: 2020-08-20 | Resolved: 2021-10-08

## 2021-10-08 PROCEDURE — 99396 PREV VISIT EST AGE 40-64: CPT | Performed by: FAMILY MEDICINE

## 2021-10-28 ENCOUNTER — CLINICAL SUPPORT (OUTPATIENT)
Dept: FAMILY MEDICINE CLINIC | Facility: CLINIC | Age: 41
End: 2021-10-28

## 2021-10-28 VITALS
HEIGHT: 68 IN | BODY MASS INDEX: 27.13 KG/M2 | HEART RATE: 71 BPM | RESPIRATION RATE: 18 BRPM | OXYGEN SATURATION: 99 % | WEIGHT: 179 LBS | SYSTOLIC BLOOD PRESSURE: 112 MMHG | DIASTOLIC BLOOD PRESSURE: 62 MMHG | TEMPERATURE: 97.8 F

## 2021-10-28 DIAGNOSIS — Z02.4 ENCOUNTER FOR CDL (COMMERCIAL DRIVING LICENSE) EXAM: Primary | ICD-10-CM

## 2021-10-28 LAB
BILIRUB BLD-MCNC: NEGATIVE MG/DL
CLARITY, POC: CLEAR
COLOR UR: YELLOW
GLUCOSE UR STRIP-MCNC: NEGATIVE MG/DL
KETONES UR QL: NEGATIVE
LEUKOCYTE EST, POC: NEGATIVE
NITRITE UR-MCNC: NEGATIVE MG/ML
PH UR: 7 [PH] (ref 5–8)
PROT UR STRIP-MCNC: NEGATIVE MG/DL
RBC # UR STRIP: NEGATIVE /UL
SP GR UR: 1 (ref 1–1.03)
UROBILINOGEN UR QL: NORMAL

## 2021-10-28 PROCEDURE — DOTPHY: Performed by: FAMILY MEDICINE

## 2021-10-28 PROCEDURE — 81003 URINALYSIS AUTO W/O SCOPE: CPT | Performed by: FAMILY MEDICINE

## 2021-10-28 NOTE — PROGRESS NOTES
" Medical Examination    Subjective   Fiorella Lo is a 41 y.o. female who presents today for a  fitness determination physical exam. The patient reports no problems.  The following portions of the patient's history were reviewed and updated as appropriate: allergies, current medications, past family history, past medical history, past social history, past surgical history and problem list.  Review of Systems  Review of Systems   Constitutional: Negative for activity change and fever.   HENT: Negative for ear pain, rhinorrhea, sinus pressure and voice change.    Eyes: Negative for visual disturbance.   Respiratory: Negative for cough and shortness of breath.    Cardiovascular: Negative for chest pain.   Gastrointestinal: Negative for abdominal pain, diarrhea, nausea and vomiting.   Endocrine: Negative for cold intolerance and heat intolerance.   Genitourinary: Negative for frequency and urgency.   Musculoskeletal: Negative for arthralgias.   Skin: Negative for rash.   Neurological: Negative for syncope.   Hematological: Does not bruise/bleed easily.   Psychiatric/Behavioral: Negative for depressed mood. The patient is not nervous/anxious.        Objective    Vision:   Uncorrected Corrected    Right Eye  20/20    Left Eye   20/20    Both Eyes   20/20      Applicant can recognize and distinguish among traffic control signals and devices showing standard red, green, and kehinde colors.    Applicant meets visual acuity requirement only when wearing corrective lenses.    Monocular Vision?: No      Hearing:    Whisper test: 8 feet bilateral (yes)         /62 (BP Location: Left arm, Patient Position: Sitting, Cuff Size: Adult)   Pulse 71   Temp 97.8 °F (36.6 °C) (Infrared)   Resp 18   Ht 172.2 cm (67.8\")   Wt 81.2 kg (179 lb)   LMP  (LMP Unknown) Comment: 2011  SpO2 99%   BMI 27.38 kg/m²   General appearance: alert, appears stated age and cooperative  Head: Normocephalic, without " obvious abnormality, atraumatic  Eyes: conjunctivae/corneas clear. PERRL, EOM's intact. Fundi benign.  Ears: normal TM's and external ear canals both ears  Nose: Nares normal. Septum midline. Mucosa normal. No drainage or sinus tenderness.  Throat: lips, mucosa, and tongue normal; teeth and gums normal  Neck: no adenopathy, no carotid bruit, no JVD, supple, symmetrical, trachea midline and thyroid not enlarged, symmetric, no tenderness/mass/nodules  Back: symmetric, no curvature. ROM normal. No CVA tenderness.  Lungs: clear to auscultation bilaterally  Heart: regular rate and rhythm, S1, S2 normal, no murmur, click, rub or gallop  Abdomen: soft, non-tender; bowel sounds normal; no masses,  no organomegaly  Extremities: extremities normal, atraumatic, no cyanosis or edema  Neurologic: Alert and oriented X 3, normal strength and tone. Normal symmetric reflexes. Normal coordination and gait    Labs:  Lab Results   Component Value Date    SPECGRAV 1.005 10/28/2021    BILIRUBINUR Negative 10/28/2021       Assessment/Plan   Healthy female exam.   Meets standards in 48 .41;  qualifies for 2 year certificate.     Medical examiners certificate completed and printed.  Return as needed.             Problems Addressed this Visit     None      Visit Diagnoses     Encounter for CDL (commercial driving license) exam    -  Primary    Relevant Orders    POCT urinalysis dipstick, multipro (Completed)      Diagnoses       Codes Comments    Encounter for CDL (commercial driving license) exam    -  Primary ICD-10-CM: Z02.4  ICD-9-CM: V70.5           I wore protective equipment throughout this patient encounter to include mask. Hand hygiene was performed before donning protective equipment and after removal when leaving the room.

## 2021-12-01 ENCOUNTER — HOSPITAL ENCOUNTER (OUTPATIENT)
Dept: MAMMOGRAPHY | Facility: HOSPITAL | Age: 41
Discharge: HOME | End: 2021-12-01
Admitting: FAMILY MEDICINE

## 2021-12-01 DIAGNOSIS — Z12.31 SCREENING MAMMOGRAM, ENCOUNTER FOR: ICD-10-CM

## 2021-12-01 PROCEDURE — 77067 SCR MAMMO BI INCL CAD: CPT

## 2021-12-01 PROCEDURE — 77063 BREAST TOMOSYNTHESIS BI: CPT

## 2021-12-29 ENCOUNTER — TELEPHONE (OUTPATIENT)
Dept: FAMILY MEDICINE CLINIC | Facility: CLINIC | Age: 41
End: 2021-12-29

## 2021-12-29 DIAGNOSIS — A60.04 HERPES SIMPLEX VULVOVAGINITIS: ICD-10-CM

## 2021-12-29 NOTE — TELEPHONE ENCOUNTER
"PATIENT IS NEEDING HER VALTREX PRESCRIPTION WRITTEN TO SAY \"USE AS NEEDED\" AND THEN SENT OVER TO     [facility]  [place], [place] - [address] - [phone]  - [phone]  [phone]    PATIENT'S INSURANCE WILL ONLY PAY FOR THREE REFILLS A YEAR THE WAY IT IS WRITTEN NOW.  "

## 2021-12-30 DIAGNOSIS — Z11.59 NEED FOR HEPATITIS C SCREENING TEST: ICD-10-CM

## 2021-12-30 DIAGNOSIS — Z13.220 SCREENING FOR HYPERLIPIDEMIA: Primary | ICD-10-CM

## 2021-12-30 DIAGNOSIS — R53.81 MALAISE AND FATIGUE: ICD-10-CM

## 2021-12-30 DIAGNOSIS — R53.83 MALAISE AND FATIGUE: ICD-10-CM

## 2021-12-30 DIAGNOSIS — A60.04 HERPES SIMPLEX VULVOVAGINITIS: ICD-10-CM

## 2021-12-30 RX ORDER — VALACYCLOVIR HYDROCHLORIDE 1 G/1
1000 TABLET, FILM COATED ORAL DAILY PRN
Qty: 30 TABLET | Refills: 12 | Status: SHIPPED | OUTPATIENT
Start: 2021-12-30 | End: 2022-01-03 | Stop reason: SDUPTHER

## 2021-12-30 RX ORDER — VALACYCLOVIR HYDROCHLORIDE 1 G/1
1000 TABLET, FILM COATED ORAL AS NEEDED
Qty: 30 TABLET | Refills: 12 | Status: CANCELLED | OUTPATIENT
Start: 2021-12-30

## 2021-12-31 LAB
ALBUMIN SERPL-MCNC: 4.5 G/DL (ref 3.8–4.8)
ALBUMIN/GLOB SERPL: 1.8 {RATIO} (ref 1.2–2.2)
ALP SERPL-CCNC: 48 IU/L (ref 44–121)
ALT SERPL-CCNC: 64 IU/L (ref 0–32)
AST SERPL-CCNC: 37 IU/L (ref 0–40)
BASOPHILS # BLD AUTO: 0.1 X10E3/UL (ref 0–0.2)
BASOPHILS NFR BLD AUTO: 1 %
BILIRUB SERPL-MCNC: 0.4 MG/DL (ref 0–1.2)
BUN SERPL-MCNC: 12 MG/DL (ref 6–24)
BUN/CREAT SERPL: 16 (ref 9–23)
CALCIUM SERPL-MCNC: 9.5 MG/DL (ref 8.7–10.2)
CHLORIDE SERPL-SCNC: 103 MMOL/L (ref 96–106)
CHOLEST SERPL-MCNC: 229 MG/DL (ref 100–199)
CHOLEST/HDLC SERPL: 4.4 RATIO (ref 0–4.4)
CO2 SERPL-SCNC: 23 MMOL/L (ref 20–29)
CREAT SERPL-MCNC: 0.74 MG/DL (ref 0.57–1)
EOSINOPHIL # BLD AUTO: 0.2 X10E3/UL (ref 0–0.4)
EOSINOPHIL NFR BLD AUTO: 2 %
ERYTHROCYTE [DISTWIDTH] IN BLOOD BY AUTOMATED COUNT: 12.1 % (ref 11.7–15.4)
GLOBULIN SER CALC-MCNC: 2.5 G/DL (ref 1.5–4.5)
GLUCOSE SERPL-MCNC: 85 MG/DL (ref 65–99)
HCT VFR BLD AUTO: 44.1 % (ref 34–46.6)
HDLC SERPL-MCNC: 52 MG/DL
HGB BLD-MCNC: 14.7 G/DL (ref 11.1–15.9)
IMM GRANULOCYTES # BLD AUTO: 0 X10E3/UL (ref 0–0.1)
IMM GRANULOCYTES NFR BLD AUTO: 0 %
LDLC SERPL CALC-MCNC: 163 MG/DL (ref 0–99)
LYMPHOCYTES # BLD AUTO: 2 X10E3/UL (ref 0.7–3.1)
LYMPHOCYTES NFR BLD AUTO: 26 %
MCH RBC QN AUTO: 30.2 PG (ref 26.6–33)
MCHC RBC AUTO-ENTMCNC: 33.3 G/DL (ref 31.5–35.7)
MCV RBC AUTO: 91 FL (ref 79–97)
MONOCYTES # BLD AUTO: 0.4 X10E3/UL (ref 0.1–0.9)
MONOCYTES NFR BLD AUTO: 6 %
NEUTROPHILS # BLD AUTO: 5 X10E3/UL (ref 1.4–7)
NEUTROPHILS NFR BLD AUTO: 65 %
PLATELET # BLD AUTO: 350 X10E3/UL (ref 150–450)
POTASSIUM SERPL-SCNC: 4.6 MMOL/L (ref 3.5–5.2)
PROT SERPL-MCNC: 7 G/DL (ref 6–8.5)
RBC # BLD AUTO: 4.86 X10E6/UL (ref 3.77–5.28)
SODIUM SERPL-SCNC: 139 MMOL/L (ref 134–144)
TRIGL SERPL-MCNC: 79 MG/DL (ref 0–149)
TSH SERPL DL<=0.005 MIU/L-ACNC: 2.11 UIU/ML (ref 0.45–4.5)
VLDLC SERPL CALC-MCNC: 14 MG/DL (ref 5–40)
WBC # BLD AUTO: 7.6 X10E3/UL (ref 3.4–10.8)

## 2022-01-03 DIAGNOSIS — A60.04 HERPES SIMPLEX VULVOVAGINITIS: ICD-10-CM

## 2022-01-03 LAB
HCV RNA SERPL NAA+PROBE-ACNC: NORMAL IU/ML
TEST INFORMATION: NORMAL

## 2022-01-03 RX ORDER — VALACYCLOVIR HYDROCHLORIDE 1 G/1
1000 TABLET, FILM COATED ORAL DAILY PRN
Qty: 90 TABLET | Refills: 3 | Status: SHIPPED | OUTPATIENT
Start: 2022-01-03

## 2022-01-03 RX ORDER — VALACYCLOVIR HYDROCHLORIDE 1 G/1
1000 TABLET, FILM COATED ORAL DAILY PRN
Qty: 30 TABLET | Refills: 12 | Status: SHIPPED | OUTPATIENT
Start: 2022-01-03 | End: 2022-01-03 | Stop reason: SDUPTHER

## 2022-01-25 ENCOUNTER — OFFICE VISIT (OUTPATIENT)
Dept: FAMILY MEDICINE CLINIC | Facility: CLINIC | Age: 42
End: 2022-01-25

## 2022-01-25 ENCOUNTER — TELEPHONE (OUTPATIENT)
Dept: FAMILY MEDICINE CLINIC | Facility: CLINIC | Age: 42
End: 2022-01-25

## 2022-01-25 VITALS
BODY MASS INDEX: 27.89 KG/M2 | WEIGHT: 184 LBS | HEIGHT: 68 IN | OXYGEN SATURATION: 98 % | DIASTOLIC BLOOD PRESSURE: 66 MMHG | RESPIRATION RATE: 18 BRPM | TEMPERATURE: 97.5 F | HEART RATE: 102 BPM | SYSTOLIC BLOOD PRESSURE: 120 MMHG

## 2022-01-25 DIAGNOSIS — J30.9 CHRONIC ALLERGIC RHINITIS: ICD-10-CM

## 2022-01-25 DIAGNOSIS — M72.2 PLANTAR FASCIAL FIBROMATOSIS OF LEFT FOOT: ICD-10-CM

## 2022-01-25 DIAGNOSIS — G43.109 MIGRAINE WITH AURA AND WITHOUT STATUS MIGRAINOSUS, NOT INTRACTABLE: ICD-10-CM

## 2022-01-25 DIAGNOSIS — L98.9 SKIN LESION OF CHEST WALL: Primary | ICD-10-CM

## 2022-01-25 PROCEDURE — 99214 OFFICE O/P EST MOD 30 MIN: CPT | Performed by: FAMILY MEDICINE

## 2022-01-25 RX ORDER — RIZATRIPTAN BENZOATE 10 MG/1
10 TABLET ORAL ONCE AS NEEDED
Qty: 12 TABLET | Refills: 12 | Status: SHIPPED | OUTPATIENT
Start: 2022-01-25

## 2022-01-25 RX ORDER — TOPIRAMATE 25 MG/1
25 TABLET ORAL DAILY
Qty: 30 TABLET | Refills: 12 | Status: SHIPPED | OUTPATIENT
Start: 2022-01-25

## 2022-01-25 RX ORDER — MONTELUKAST SODIUM 10 MG/1
10 TABLET ORAL NIGHTLY
Qty: 30 TABLET | Refills: 12 | Status: SHIPPED | OUTPATIENT
Start: 2022-01-25

## 2022-01-25 NOTE — ASSESSMENT & PLAN NOTE
Near area where basal cell was (clean margins)  The area is dry and itchy. Start moisturizer    She is going to see derm

## 2022-01-25 NOTE — ASSESSMENT & PLAN NOTE
Headaches are worsening.  Advised to keep a headache diary.  Medication changes per orders.    Dicussed if there is loss of vision, confusion, weakness or numbness in an extremity, dropping of an eyelid or one side of the face, severe pain, intractable vomiting, go to the ER

## 2022-01-25 NOTE — PROGRESS NOTES
Subjective   Fiorella Lo is a 41 y.o. female. Presents to [facility]    Chief Complaint   Patient presents with   • Foot Pain   • Headache   • Allergies   • Skin Lesion       Lesion:   Fiorella Lo is here today for a lesion. The lesion appeared unknown and has been occurring for  month(s) ago.. The course has been stable. The lesion is characterized as red. The lesion is located over chest. The symptoms have been associated with has been biospied before.       Foot Pain  This is a new (left heel) problem. The current episode started 1 to 4 weeks ago. The problem occurs daily. The problem has been gradually worsening. Associated symptoms include congestion, headaches, nausea, neck pain and a visual change. Pertinent negatives include no abdominal pain, chest pain, chills, coughing, fatigue, numbness, sore throat or vomiting. The symptoms are aggravated by walking, standing and exertion. Treatments tried: stretching and ice. The treatment provided mild relief.   Headache   This is a new problem. The problem occurs intermittently. The problem has been gradually worsening. Pain location: behind eyes and base of neck. The pain radiates to the left neck and right neck. The quality of the pain is described as throbbing and stabbing (depening on severity). Associated symptoms include blurred vision, eye pain, nausea, neck pain, phonophobia, photophobia and a visual change. Pertinent negatives include no abdominal pain, coughing, eye redness, eye watering, insomnia, numbness, scalp tenderness, sinus pressure, sore throat, tingling or vomiting. Nothing aggravates the symptoms. She has tried darkened room for the symptoms. The treatment provided mild relief.   Allergies  This is a recurrent problem. The current episode started more than 1 month ago. The problem occurs daily. The problem has been gradually worsening. Associated symptoms include congestion, headaches, nausea, neck pain and a visual change.  Pertinent negatives include no abdominal pain, chest pain, chills, coughing, fatigue, numbness, sore throat or vomiting. Nothing aggravates the symptoms. Treatments tried: allergy OTC.      Her headaches (hx of migraine) are worse. She is getting auras.     I personally reviewed and updated the patient's allergies, medications, problem list, and past medical, surgical, social, and family history. I have reviewed and confirmed the accuracy of the History of Present Illness and Review of Symptoms as documented by the MA/LPN/RN. Brooklyn Encinas MD    Allergies:  No Known Allergies    Social History:  Social History     Socioeconomic History   • Marital status:    Tobacco Use   • Smoking status: Current Some Day Smoker     Types: Cigarettes   • Smokeless tobacco: Never Used   • Tobacco comment: Social smoker   Vaping Use   • Vaping Use: Never used   Substance and Sexual Activity   • Alcohol use: Yes     Comment: Socially   • Drug use: Never       Family History:  Family History   Problem Relation Age of Onset   • Breast cancer Mother    • Liver disease Father    • Breast cancer Maternal Grandmother    • Breast cancer Paternal Grandmother        Past Medical History :  Patient Active Problem List   Diagnosis   • Genital herpes   • Encounter for routine adult physical exam with abnormal findings   • Breast lump   • H/O vaginal hysterectomy   • Skin lesion of chest wall   • Migraine with aura and without status migrainosus, not intractable   • Chronic allergic rhinitis   • Plantar fascial fibromatosis of left foot       Medication List:    Current Outpatient Medications:   •  fexofenadine (Allegra Allergy) 180 MG tablet, Take 180 mg by mouth Daily., Disp: , Rfl:   •  fluticasone (FLONASE) 50 MCG/ACT nasal spray, 2 sprays into the nostril(s) as directed by provider Daily., Disp: , Rfl:   •  valACYclovir (VALTREX) 1000 MG tablet, Take 1 tablet by mouth Daily As Needed (outbreak)., Disp: 90 tablet, Rfl: 3  •   "montelukast (SINGULAIR) 10 MG tablet, Take 1 tablet by mouth Every Night., Disp: 30 tablet, Rfl: 12  •  rizatriptan (MAXALT) 10 MG tablet, Take 1 tablet by mouth 1 (One) Time As Needed for Migraine (May repeat in 2 hours if needed.  (Maximum of 3 tablets in 24 hours))., Disp: 12 tablet, Rfl: 12  •  topiramate (TOPAMAX) 25 MG tablet, Take 1 tablet by mouth Daily., Disp: 30 tablet, Rfl: 12    Past Surgical History:  Past Surgical History:   Procedure Laterality Date   • HYSTERECTOMY  2011     partial still has both ovaries   • TUBAL ABDOMINAL LIGATION  2008       Review of Systems:  Review of Systems   Constitutional: Negative for chills and fatigue.   HENT: Positive for congestion. Negative for sinus pressure and sore throat.    Eyes: Positive for blurred vision, photophobia and pain. Negative for redness.   Respiratory: Negative for cough.    Cardiovascular: Negative for chest pain.   Gastrointestinal: Positive for nausea. Negative for abdominal pain and vomiting.   Musculoskeletal: Positive for neck pain.   Neurological: Negative for tingling and numbness.   Psychiatric/Behavioral: The patient does not have insomnia.        Physical Exam:  Vital Signs:  Vital Signs:   /66   Pulse 102   Temp 97.5 °F (36.4 °C)   Resp 18   Ht 172.2 cm (67.8\")   Wt 83.5 kg (184 lb)   SpO2 98%   BMI 28.14 kg/m²     Result Review :                Physical Exam  Vitals reviewed.   Constitutional:       Appearance: Normal appearance. She is well-developed.   HENT:      Head: Normocephalic and atraumatic.   Eyes:      General:         Right eye: No discharge.         Left eye: No discharge.   Cardiovascular:      Rate and Rhythm: Normal rate and regular rhythm.      Heart sounds: Normal heart sounds. No murmur heard.  No friction rub. No gallop.    Pulmonary:      Effort: Pulmonary effort is normal. No respiratory distress.      Breath sounds: Normal breath sounds. No wheezing or rales.   Musculoskeletal:      Left foot: " Tenderness (plantar) present.   Skin:     General: Skin is warm and dry.      Findings: No rash.   Neurological:      Mental Status: She is alert and oriented to person, place, and time.      Coordination: Coordination normal.      Gait: Gait normal.   Psychiatric:         Behavior: Behavior is cooperative.         Assessment and Plan:  Problems Addressed this Visit        Allergies and Adverse Reactions    Chronic allergic rhinitis     Start montelukast  Diagnosis, treatment and and course discussed. Potential side effects discussed. Return if there is worsening or persistence of symptoms.            Relevant Medications    montelukast (SINGULAIR) 10 MG tablet    Other Relevant Orders    Ambulatory Referral to Allergy (Completed)       Musculoskeletal and Injuries    Plantar fascial fibromatosis of left foot       Neuro    Migraine with aura and without status migrainosus, not intractable     Headaches are worsening.  Advised to keep a headache diary.  Medication changes per orders.    Dicussed if there is loss of vision, confusion, weakness or numbness in an extremity, dropping of an eyelid or one side of the face, severe pain, intractable vomiting, go to the ER             Relevant Medications    rizatriptan (MAXALT) 10 MG tablet    topiramate (TOPAMAX) 25 MG tablet       Skin    Skin lesion of chest wall - Primary     Near area where basal cell was (clean margins)  The area is dry and itchy. Start moisturizer    She is going to see derm           Diagnoses       Codes Comments    Skin lesion of chest wall    -  Primary ICD-10-CM: L98.9  ICD-9-CM: 709.9     Migraine with aura and without status migrainosus, not intractable     ICD-10-CM: G43.109  ICD-9-CM: 346.00     Chronic allergic rhinitis     ICD-10-CM: J30.9  ICD-9-CM: 477.9     Plantar fascial fibromatosis of left foot     ICD-10-CM: M72.2  ICD-9-CM: 728.71            An After Visit Summary and PPPS were given to the patient.       I wore protective  equipment throughout this patient encounter to include respirator. Hand hygiene was performed before donning protective equipment and after removal when leaving the room.

## 2022-01-25 NOTE — ASSESSMENT & PLAN NOTE
Start montelukast  Diagnosis, treatment and and course discussed. Potential side effects discussed. Return if there is worsening or persistence of symptoms.

## 2022-03-08 ENCOUNTER — TELEPHONE (OUTPATIENT)
Dept: FAMILY MEDICINE CLINIC | Facility: CLINIC | Age: 42
End: 2022-03-08

## 2022-03-08 PROBLEM — F17.200 TOBACCO USE DISORDER: Status: ACTIVE | Noted: 2022-03-08
